# Patient Record
Sex: FEMALE | Race: OTHER | HISPANIC OR LATINO | URBAN - METROPOLITAN AREA
[De-identification: names, ages, dates, MRNs, and addresses within clinical notes are randomized per-mention and may not be internally consistent; named-entity substitution may affect disease eponyms.]

---

## 2017-03-02 ENCOUNTER — EMERGENCY (EMERGENCY)
Facility: HOSPITAL | Age: 52
LOS: 1 days | Discharge: HOME | End: 2017-03-02
Attending: EMERGENCY MEDICINE
Payer: COMMERCIAL

## 2017-03-02 VITALS
SYSTOLIC BLOOD PRESSURE: 146 MMHG | HEART RATE: 83 BPM | TEMPERATURE: 97 F | DIASTOLIC BLOOD PRESSURE: 90 MMHG | HEIGHT: 60 IN | WEIGHT: 149.91 LBS | RESPIRATION RATE: 20 BRPM | OXYGEN SATURATION: 95 %

## 2017-03-02 DIAGNOSIS — R51 HEADACHE: ICD-10-CM

## 2017-03-02 DIAGNOSIS — R11.2 NAUSEA WITH VOMITING, UNSPECIFIED: ICD-10-CM

## 2017-03-02 DIAGNOSIS — E03.9 HYPOTHYROIDISM, UNSPECIFIED: ICD-10-CM

## 2017-03-02 LAB
ALBUMIN SERPL ELPH-MCNC: 3.6 G/DL — SIGNIFICANT CHANGE UP (ref 3.3–5.2)
ALP SERPL-CCNC: 110 U/L — SIGNIFICANT CHANGE UP (ref 40–120)
ALT FLD-CCNC: 23 U/L — SIGNIFICANT CHANGE UP
ANION GAP SERPL CALC-SCNC: 8 MMOL/L — SIGNIFICANT CHANGE UP (ref 5–17)
AST SERPL-CCNC: 24 U/L — SIGNIFICANT CHANGE UP
BASOPHILS # BLD AUTO: 0 K/UL — SIGNIFICANT CHANGE UP (ref 0–0.2)
BASOPHILS NFR BLD AUTO: 0.3 % — SIGNIFICANT CHANGE UP (ref 0–2)
BILIRUB SERPL-MCNC: 0.1 MG/DL — LOW (ref 0.4–2)
BUN SERPL-MCNC: 18 MG/DL — SIGNIFICANT CHANGE UP (ref 8–20)
CALCIUM SERPL-MCNC: 9.6 MG/DL — SIGNIFICANT CHANGE UP (ref 8.6–10.2)
CHLORIDE SERPL-SCNC: 106 MMOL/L — SIGNIFICANT CHANGE UP (ref 98–107)
CO2 SERPL-SCNC: 28 MMOL/L — SIGNIFICANT CHANGE UP (ref 22–29)
CREAT SERPL-MCNC: 1.01 MG/DL — SIGNIFICANT CHANGE UP (ref 0.5–1.3)
EOSINOPHIL # BLD AUTO: 0.1 K/UL — SIGNIFICANT CHANGE UP (ref 0–0.5)
EOSINOPHIL NFR BLD AUTO: 1.1 % — SIGNIFICANT CHANGE UP (ref 0–6)
GLUCOSE SERPL-MCNC: 123 MG/DL — HIGH (ref 70–115)
HCT VFR BLD CALC: 33.1 % — LOW (ref 37–47)
HGB BLD-MCNC: 10.6 G/DL — LOW (ref 12–16)
LYMPHOCYTES # BLD AUTO: 1.5 K/UL — SIGNIFICANT CHANGE UP (ref 1–4.8)
LYMPHOCYTES # BLD AUTO: 17.1 % — LOW (ref 20–55)
MCHC RBC-ENTMCNC: 27.6 PG — SIGNIFICANT CHANGE UP (ref 27–31)
MCHC RBC-ENTMCNC: 32 G/DL — SIGNIFICANT CHANGE UP (ref 32–36)
MCV RBC AUTO: 86.2 FL — SIGNIFICANT CHANGE UP (ref 81–99)
MONOCYTES # BLD AUTO: 0.4 K/UL — SIGNIFICANT CHANGE UP (ref 0–0.8)
MONOCYTES NFR BLD AUTO: 4.3 % — SIGNIFICANT CHANGE UP (ref 3–10)
NEUTROPHILS # BLD AUTO: 6.9 K/UL — SIGNIFICANT CHANGE UP (ref 1.8–8)
NEUTROPHILS NFR BLD AUTO: 77 % — HIGH (ref 37–73)
PLATELET # BLD AUTO: 361 K/UL — SIGNIFICANT CHANGE UP (ref 150–400)
POTASSIUM SERPL-MCNC: 4.4 MMOL/L — SIGNIFICANT CHANGE UP (ref 3.5–5.3)
POTASSIUM SERPL-SCNC: 4.4 MMOL/L — SIGNIFICANT CHANGE UP (ref 3.5–5.3)
PROT SERPL-MCNC: 8 G/DL — SIGNIFICANT CHANGE UP (ref 6.6–8.7)
RBC # BLD: 3.84 M/UL — LOW (ref 4.4–5.2)
RBC # FLD: 14.9 % — SIGNIFICANT CHANGE UP (ref 11–15.6)
SODIUM SERPL-SCNC: 142 MMOL/L — SIGNIFICANT CHANGE UP (ref 135–145)
WBC # BLD: 8.9 K/UL — SIGNIFICANT CHANGE UP (ref 4.8–10.8)
WBC # FLD AUTO: 8.9 K/UL — SIGNIFICANT CHANGE UP (ref 4.8–10.8)

## 2017-03-02 PROCEDURE — 70450 CT HEAD/BRAIN W/O DYE: CPT

## 2017-03-02 PROCEDURE — 70450 CT HEAD/BRAIN W/O DYE: CPT | Mod: 26

## 2017-03-02 PROCEDURE — 80053 COMPREHEN METABOLIC PANEL: CPT

## 2017-03-02 PROCEDURE — 99053 MED SERV 10PM-8AM 24 HR FAC: CPT

## 2017-03-02 PROCEDURE — 96374 THER/PROPH/DIAG INJ IV PUSH: CPT

## 2017-03-02 PROCEDURE — 99284 EMERGENCY DEPT VISIT MOD MDM: CPT | Mod: 25

## 2017-03-02 PROCEDURE — 96375 TX/PRO/DX INJ NEW DRUG ADDON: CPT

## 2017-03-02 PROCEDURE — 85027 COMPLETE CBC AUTOMATED: CPT

## 2017-03-02 RX ORDER — SODIUM CHLORIDE 9 MG/ML
1000 INJECTION INTRAMUSCULAR; INTRAVENOUS; SUBCUTANEOUS ONCE
Qty: 0 | Refills: 0 | Status: COMPLETED | OUTPATIENT
Start: 2017-03-02 | End: 2017-03-02

## 2017-03-02 RX ORDER — KETOROLAC TROMETHAMINE 30 MG/ML
30 SYRINGE (ML) INJECTION ONCE
Qty: 0 | Refills: 0 | Status: DISCONTINUED | OUTPATIENT
Start: 2017-03-02 | End: 2017-03-02

## 2017-03-02 RX ORDER — ONDANSETRON 8 MG/1
1 TABLET, FILM COATED ORAL
Qty: 12 | Refills: 0
Start: 2017-03-02 | End: 2017-03-06

## 2017-03-02 RX ORDER — ACETAMINOPHEN 500 MG
1000 TABLET ORAL ONCE
Qty: 0 | Refills: 0 | Status: COMPLETED | OUTPATIENT
Start: 2017-03-02 | End: 2017-03-02

## 2017-03-02 RX ORDER — METOCLOPRAMIDE HCL 10 MG
10 TABLET ORAL ONCE
Qty: 0 | Refills: 0 | Status: COMPLETED | OUTPATIENT
Start: 2017-03-02 | End: 2017-03-02

## 2017-03-02 RX ORDER — SODIUM CHLORIDE 9 MG/ML
1000 INJECTION INTRAMUSCULAR; INTRAVENOUS; SUBCUTANEOUS
Qty: 0 | Refills: 0 | Status: DISCONTINUED | OUTPATIENT
Start: 2017-03-02 | End: 2017-03-06

## 2017-03-02 RX ADMIN — SODIUM CHLORIDE 1000 MILLILITER(S): 9 INJECTION INTRAMUSCULAR; INTRAVENOUS; SUBCUTANEOUS at 08:58

## 2017-03-02 RX ADMIN — Medication 10 MILLIGRAM(S): at 08:56

## 2017-03-02 RX ADMIN — Medication 400 MILLIGRAM(S): at 12:00

## 2017-03-02 RX ADMIN — Medication 30 MILLIGRAM(S): at 08:56

## 2017-03-02 RX ADMIN — SODIUM CHLORIDE 150 MILLILITER(S): 9 INJECTION INTRAMUSCULAR; INTRAVENOUS; SUBCUTANEOUS at 11:30

## 2017-03-02 RX ADMIN — Medication 30 MILLIGRAM(S): at 09:34

## 2017-03-02 RX ADMIN — Medication 1000 MILLIGRAM(S): at 12:58

## 2017-03-02 NOTE — ED STATDOCS - MEDICAL DECISION MAKING DETAILS
Patient with HA described as different from usual HA s/p mild trauma. will do CT, Labs, give fluids, Reglan, Toradol, re-eval.

## 2017-03-02 NOTE — ED STATDOCS - ENMT, MLM
[place] sized non boggy bruise to left parietal area. No Raccoon eyes, Quezada's sign, otorrhea, rhinorrhea.

## 2017-03-02 NOTE — ED STATDOCS - OBJECTIVE STATEMENT
52 y/o female, h/o hypothyroidism, presents c/o HA x 2 days. She notes onset occurred 1 day after she banged her head against an attic ceiling accidentally. Denies LOC. Patient took 3 Aleve last night before bed. Pt has h/o tension headaches but notes this is different from her usual HAs. Denies illicit drug use, smoking history. Pt also has associated nausea and 1 episode of vomiting this morning. No further complaints at this time.

## 2017-03-02 NOTE — ED ADULT NURSE NOTE - OBJECTIVE STATEMENT
pt presents to ED with headache x few days s/p hit her head on metal beam in attic. pt denies LOC.; pt c/o n/v since yesterday. breahting is even and unlabored. fluids infusing well. will continue to monitor and reassess

## 2017-03-02 NOTE — ED STATDOCS - PROGRESS NOTE DETAILS
PA NOTE: Pt seen by intake physician and hpi/orders/plan reviewed. PT presenting to ED with complaints of headache x2 days.  Patient states that she hit her head on a beam in the attic.  Denies LOC  PE: GEN: Awake, alert,  NAD,  EYES: PERRL CARDIAC: Reg rate and rhythm, S1,S2, RRR  RESP: No distress noted. Lungs CTA bilaterally no wheeze, ronchi, rales. ABD: soft,  non-tender, no guarding. . NEURO: AOx3, no focal deficits   PLAN: CT and reassess pt states that pain was originally 10/10 this morning, now 5/10 pain now 3/10

## 2017-03-02 NOTE — ED STATDOCS - NS ED MD SCRIBE ATTENDING SCRIBE SECTIONS
REVIEW OF SYSTEMS/HISTORY OF PRESENT ILLNESS/PAST MEDICAL/SURGICAL/SOCIAL HISTORY/HIV/PHYSICAL EXAM/DISPOSITION/VITAL SIGNS( Pullset)

## 2017-03-02 NOTE — ED STATDOCS - NEUROLOGICAL, MLM
Normal sensation in all quadrants of face, normal strength in each side of face, tongue midline, normal jaw clench, symmetrical smile, normal shoulder shrug, no pronator drift, good strength upper and lower extremities, normal thumb to other fingers, hand over hand and finger to nose, normal heel knee shin, normal lower extremity DTRs, no babinski. Mild difficulty walking straight line.

## 2017-07-04 ENCOUNTER — EMERGENCY (EMERGENCY)
Facility: HOSPITAL | Age: 52
LOS: 1 days | Discharge: HOME | End: 2017-07-04
Attending: STUDENT IN AN ORGANIZED HEALTH CARE EDUCATION/TRAINING PROGRAM
Payer: MEDICAID

## 2017-07-04 VITALS
SYSTOLIC BLOOD PRESSURE: 118 MMHG | TEMPERATURE: 98 F | DIASTOLIC BLOOD PRESSURE: 75 MMHG | HEART RATE: 77 BPM | OXYGEN SATURATION: 96 % | RESPIRATION RATE: 18 BRPM

## 2017-07-04 VITALS
OXYGEN SATURATION: 97 % | WEIGHT: 160.06 LBS | TEMPERATURE: 97 F | SYSTOLIC BLOOD PRESSURE: 112 MMHG | RESPIRATION RATE: 18 BRPM | HEART RATE: 84 BPM | HEIGHT: 60 IN | DIASTOLIC BLOOD PRESSURE: 84 MMHG

## 2017-07-04 PROCEDURE — 72040 X-RAY EXAM NECK SPINE 2-3 VW: CPT | Mod: 26

## 2017-07-04 PROCEDURE — 72040 X-RAY EXAM NECK SPINE 2-3 VW: CPT

## 2017-07-04 PROCEDURE — 73564 X-RAY EXAM KNEE 4 OR MORE: CPT

## 2017-07-04 PROCEDURE — 90471 IMMUNIZATION ADMIN: CPT

## 2017-07-04 PROCEDURE — 99284 EMERGENCY DEPT VISIT MOD MDM: CPT | Mod: 25

## 2017-07-04 PROCEDURE — 73564 X-RAY EXAM KNEE 4 OR MORE: CPT | Mod: 26,LT

## 2017-07-04 PROCEDURE — 90715 TDAP VACCINE 7 YRS/> IM: CPT

## 2017-07-04 PROCEDURE — 73562 X-RAY EXAM OF KNEE 3: CPT

## 2017-07-04 RX ORDER — TETANUS TOXOID, REDUCED DIPHTHERIA TOXOID AND ACELLULAR PERTUSSIS VACCINE, ADSORBED 5; 2.5; 8; 8; 2.5 [IU]/.5ML; [IU]/.5ML; UG/.5ML; UG/.5ML; UG/.5ML
0.5 SUSPENSION INTRAMUSCULAR ONCE
Qty: 0 | Refills: 0 | Status: COMPLETED | OUTPATIENT
Start: 2017-07-04 | End: 2017-07-04

## 2017-07-04 RX ORDER — METHOCARBAMOL 500 MG/1
750 TABLET, FILM COATED ORAL ONCE
Qty: 0 | Refills: 0 | Status: COMPLETED | OUTPATIENT
Start: 2017-07-04 | End: 2017-07-04

## 2017-07-04 RX ORDER — IBUPROFEN 200 MG
600 TABLET ORAL ONCE
Qty: 0 | Refills: 0 | Status: COMPLETED | OUTPATIENT
Start: 2017-07-04 | End: 2017-07-04

## 2017-07-04 RX ADMIN — METHOCARBAMOL 750 MILLIGRAM(S): 500 TABLET, FILM COATED ORAL at 03:36

## 2017-07-04 RX ADMIN — TETANUS TOXOID, REDUCED DIPHTHERIA TOXOID AND ACELLULAR PERTUSSIS VACCINE, ADSORBED 0.5 MILLILITER(S): 5; 2.5; 8; 8; 2.5 SUSPENSION INTRAMUSCULAR at 03:36

## 2017-07-04 RX ADMIN — Medication 600 MILLIGRAM(S): at 03:36
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
## 2017-07-04 NOTE — ED PROVIDER NOTE - MEDICAL DECISION MAKING DETAILS
Will check X-ray, medicate and re-eval. Sister at bedside will take care of pt when dc pt  is dc'd home.
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
## 2017-09-25 PROBLEM — Z00.00 ENCOUNTER FOR PREVENTIVE HEALTH EXAMINATION: Status: ACTIVE | Noted: 2017-09-25

## 2017-10-13 ENCOUNTER — APPOINTMENT (OUTPATIENT)
Dept: NEUROLOGY | Facility: CLINIC | Age: 52
End: 2017-10-13

## 2017-11-02 ENCOUNTER — RESULT REVIEW (OUTPATIENT)
Age: 52
End: 2017-11-02

## 2019-10-08 ENCOUNTER — APPOINTMENT (OUTPATIENT)
Dept: ENDOCRINOLOGY | Facility: CLINIC | Age: 54
End: 2019-10-08

## 2021-07-12 ENCOUNTER — EMERGENCY (EMERGENCY)
Facility: HOSPITAL | Age: 56
LOS: 1 days | Discharge: HOME | End: 2021-07-12
Attending: EMERGENCY MEDICINE
Payer: MEDICAID

## 2021-07-12 VITALS
RESPIRATION RATE: 34 BRPM | OXYGEN SATURATION: 100 % | HEIGHT: 60 IN | SYSTOLIC BLOOD PRESSURE: 180 MMHG | DIASTOLIC BLOOD PRESSURE: 140 MMHG | HEART RATE: 112 BPM

## 2021-07-12 DIAGNOSIS — Z98.891 HISTORY OF UTERINE SCAR FROM PREVIOUS SURGERY: Chronic | ICD-10-CM

## 2021-07-12 LAB
ALBUMIN SERPL ELPH-MCNC: 3.9 G/DL — SIGNIFICANT CHANGE UP (ref 3.3–5.2)
ALP SERPL-CCNC: 114 U/L — SIGNIFICANT CHANGE UP (ref 40–120)
ALT FLD-CCNC: 19 U/L — SIGNIFICANT CHANGE UP
ANION GAP SERPL CALC-SCNC: 13 MMOL/L — SIGNIFICANT CHANGE UP (ref 5–17)
APTT BLD: 32.9 SEC — SIGNIFICANT CHANGE UP (ref 27.5–35.5)
AST SERPL-CCNC: 23 U/L — SIGNIFICANT CHANGE UP
BASOPHILS # BLD AUTO: 0.04 K/UL — SIGNIFICANT CHANGE UP (ref 0–0.2)
BASOPHILS NFR BLD AUTO: 0.3 % — SIGNIFICANT CHANGE UP (ref 0–2)
BILIRUB SERPL-MCNC: 0.4 MG/DL — SIGNIFICANT CHANGE UP (ref 0.4–2)
BUN SERPL-MCNC: 25.3 MG/DL — HIGH (ref 8–20)
CALCIUM SERPL-MCNC: 9.4 MG/DL — SIGNIFICANT CHANGE UP (ref 8.6–10.2)
CHLORIDE SERPL-SCNC: 102 MMOL/L — SIGNIFICANT CHANGE UP (ref 98–107)
CO2 SERPL-SCNC: 24 MMOL/L — SIGNIFICANT CHANGE UP (ref 22–29)
CREAT SERPL-MCNC: 1.57 MG/DL — HIGH (ref 0.5–1.3)
EOSINOPHIL # BLD AUTO: 0.14 K/UL — SIGNIFICANT CHANGE UP (ref 0–0.5)
EOSINOPHIL NFR BLD AUTO: 1.2 % — SIGNIFICANT CHANGE UP (ref 0–6)
GLUCOSE SERPL-MCNC: 91 MG/DL — SIGNIFICANT CHANGE UP (ref 70–99)
HCT VFR BLD CALC: 38.9 % — SIGNIFICANT CHANGE UP (ref 34.5–45)
HGB BLD-MCNC: 12.5 G/DL — SIGNIFICANT CHANGE UP (ref 11.5–15.5)
IMM GRANULOCYTES NFR BLD AUTO: 0.5 % — SIGNIFICANT CHANGE UP (ref 0–1.5)
INR BLD: 1.12 RATIO — SIGNIFICANT CHANGE UP (ref 0.88–1.16)
LYMPHOCYTES # BLD AUTO: 16.7 % — SIGNIFICANT CHANGE UP (ref 13–44)
LYMPHOCYTES # BLD AUTO: 2.03 K/UL — SIGNIFICANT CHANGE UP (ref 1–3.3)
MCHC RBC-ENTMCNC: 28.4 PG — SIGNIFICANT CHANGE UP (ref 27–34)
MCHC RBC-ENTMCNC: 32.1 GM/DL — SIGNIFICANT CHANGE UP (ref 32–36)
MCV RBC AUTO: 88.4 FL — SIGNIFICANT CHANGE UP (ref 80–100)
MONOCYTES # BLD AUTO: 0.5 K/UL — SIGNIFICANT CHANGE UP (ref 0–0.9)
MONOCYTES NFR BLD AUTO: 4.1 % — SIGNIFICANT CHANGE UP (ref 2–14)
NEUTROPHILS # BLD AUTO: 9.39 K/UL — HIGH (ref 1.8–7.4)
NEUTROPHILS NFR BLD AUTO: 77.2 % — HIGH (ref 43–77)
PLATELET # BLD AUTO: 327 K/UL — SIGNIFICANT CHANGE UP (ref 150–400)
POTASSIUM SERPL-MCNC: 4.1 MMOL/L — SIGNIFICANT CHANGE UP (ref 3.5–5.3)
POTASSIUM SERPL-SCNC: 4.1 MMOL/L — SIGNIFICANT CHANGE UP (ref 3.5–5.3)
PROT SERPL-MCNC: 8.1 G/DL — SIGNIFICANT CHANGE UP (ref 6.6–8.7)
PROTHROM AB SERPL-ACNC: 12.9 SEC — SIGNIFICANT CHANGE UP (ref 10.6–13.6)
RBC # BLD: 4.4 M/UL — SIGNIFICANT CHANGE UP (ref 3.8–5.2)
RBC # FLD: 14.8 % — HIGH (ref 10.3–14.5)
SODIUM SERPL-SCNC: 139 MMOL/L — SIGNIFICANT CHANGE UP (ref 135–145)
WBC # BLD: 12.16 K/UL — HIGH (ref 3.8–10.5)
WBC # FLD AUTO: 12.16 K/UL — HIGH (ref 3.8–10.5)

## 2021-07-12 PROCEDURE — 71275 CT ANGIOGRAPHY CHEST: CPT | Mod: 26,MA

## 2021-07-12 PROCEDURE — 93010 ELECTROCARDIOGRAM REPORT: CPT

## 2021-07-12 PROCEDURE — 99220: CPT

## 2021-07-12 PROCEDURE — 93010 ELECTROCARDIOGRAM REPORT: CPT | Mod: 77

## 2021-07-12 PROCEDURE — 71045 X-RAY EXAM CHEST 1 VIEW: CPT | Mod: 26

## 2021-07-12 PROCEDURE — 99285 EMERGENCY DEPT VISIT HI MDM: CPT

## 2021-07-12 PROCEDURE — 93971 EXTREMITY STUDY: CPT | Mod: 26,RT

## 2021-07-12 RX ORDER — LOSARTAN POTASSIUM 100 MG/1
50 TABLET, FILM COATED ORAL DAILY
Refills: 0 | Status: DISCONTINUED | OUTPATIENT
Start: 2021-07-12 | End: 2021-07-17

## 2021-07-12 RX ORDER — MORPHINE SULFATE 50 MG/1
8 CAPSULE, EXTENDED RELEASE ORAL ONCE
Refills: 0 | Status: DISCONTINUED | OUTPATIENT
Start: 2021-07-12 | End: 2021-07-12

## 2021-07-12 RX ORDER — LOSARTAN POTASSIUM 100 MG/1
1 TABLET, FILM COATED ORAL
Qty: 0 | Refills: 0 | DISCHARGE

## 2021-07-12 RX ORDER — LEVOTHYROXINE SODIUM 125 MCG
200 TABLET ORAL DAILY
Refills: 0 | Status: DISCONTINUED | OUTPATIENT
Start: 2021-07-12 | End: 2021-07-17

## 2021-07-12 RX ORDER — SODIUM CHLORIDE 9 MG/ML
1000 INJECTION INTRAMUSCULAR; INTRAVENOUS; SUBCUTANEOUS ONCE
Refills: 0 | Status: COMPLETED | OUTPATIENT
Start: 2021-07-12 | End: 2021-07-12

## 2021-07-12 RX ORDER — ATORVASTATIN CALCIUM 80 MG/1
20 TABLET, FILM COATED ORAL AT BEDTIME
Refills: 0 | Status: DISCONTINUED | OUTPATIENT
Start: 2021-07-12 | End: 2021-07-17

## 2021-07-12 RX ORDER — METFORMIN HYDROCHLORIDE 850 MG/1
1 TABLET ORAL
Qty: 0 | Refills: 0 | DISCHARGE

## 2021-07-12 RX ORDER — LEVOTHYROXINE SODIUM 125 MCG
1 TABLET ORAL
Qty: 0 | Refills: 0 | DISCHARGE

## 2021-07-12 RX ORDER — METFORMIN HYDROCHLORIDE 850 MG/1
750 TABLET ORAL
Refills: 0 | Status: DISCONTINUED | OUTPATIENT
Start: 2021-07-12 | End: 2021-07-17

## 2021-07-12 RX ORDER — IPRATROPIUM/ALBUTEROL SULFATE 18-103MCG
3 AEROSOL WITH ADAPTER (GRAM) INHALATION ONCE
Refills: 0 | Status: DISCONTINUED | OUTPATIENT
Start: 2021-07-12 | End: 2021-07-17

## 2021-07-12 RX ORDER — ATORVASTATIN CALCIUM 80 MG/1
1 TABLET, FILM COATED ORAL
Qty: 0 | Refills: 0 | DISCHARGE

## 2021-07-12 RX ORDER — KETOROLAC TROMETHAMINE 30 MG/ML
15 SYRINGE (ML) INJECTION ONCE
Refills: 0 | Status: DISCONTINUED | OUTPATIENT
Start: 2021-07-12 | End: 2021-07-12

## 2021-07-12 RX ADMIN — SODIUM CHLORIDE 1000 MILLILITER(S): 9 INJECTION INTRAMUSCULAR; INTRAVENOUS; SUBCUTANEOUS at 18:22

## 2021-07-12 RX ADMIN — MORPHINE SULFATE 8 MILLIGRAM(S): 50 CAPSULE, EXTENDED RELEASE ORAL at 18:22

## 2021-07-12 RX ADMIN — Medication 15 MILLIGRAM(S): at 19:53

## 2021-07-12 NOTE — ED CDU PROVIDER INITIAL DAY NOTE - PHYSICAL EXAMINATION
General: In NAD, non-diaphoretic, non-toxic appearing, anxious appearing.   Skin: No rashes or lesions. Warm, dry, color normal for race.   Eyes: Sclera anicteric, conjunctivae clear b/l. No discharge. PERRLA, EOMI.  Neck: Supple, FROM.  Cardio: Rate and rhythm regular. No audible murmur, gallop, or rub.   PV: No edema of feet/ankles. Mild right calf TTP, no calf swelling. Pulses: b/l 2+ radial, DP, PT. Capillary refill <2 seconds.  Resp: Mildly tachypneic. Normal AP to lateral diameter, symmetrical excursion b/l. Breath sounds vesicular, symmetrical and without rales, rhonchi or wheezing b/l.  Abd: Non-distended. Soft, non-tender, no masses palpated. No rebound, guarding.   MSK: MAEx4. FROM.   Neuro: A&Ox3. No motor/sensory deficits.

## 2021-07-12 NOTE — ED CDU PROVIDER INITIAL DAY NOTE - OBJECTIVE STATEMENT
55 yo female PMHx IgA nephropathy, HTN, NIDDM2, HLD, hypothyroidism, migraines presents to ED c/o chest pain at approx. 1730. Patient arrived home from [place] today. Patient with sternal chest pain, constant. Also with back pain described as an ache, SOB, and RLE pain over the last few days described as cramping in nature. No further complaints at this time.   Denies fevers, chills, nausea, vomiting, diarrhea.

## 2021-07-12 NOTE — ED ADULT NURSE NOTE - OBJECTIVE STATEMENT
pt BIBA from home with reports of right leg pain with sudden onset chest pain and pressure. pt with inability to speak on arrival due to pain. pt reports she just flew home from [place] yesterday. pt with pedal pulses present and equal bilaterally. skin warm dry and intact.

## 2021-07-12 NOTE — ED CDU PROVIDER INITIAL DAY NOTE - MEDICAL DECISION MAKING DETAILS
57 yo female PMHx IgA nephropathy, HTN, NIDDM2, HLD, hypothyroidism, migraines presents to ED c/o chest pain at approx. 1730. In ED, labs, CTA, US doppler negative. Patient placed in CDU for telemetry monitoring, serial EKG/troponin, cardiology consult (placed by ED ).

## 2021-07-12 NOTE — ED PROVIDER NOTE - OBJECTIVE STATEMENT
Pt is a 55yo F presenting with chest pain. Patient states that she came back from [place] today. Patient is endorsing 10/10 sharp chest pain radiating to her back beginning 15 minutes before coming to the ER. She also reports shortness of breath and RLE pain. Patient reports difficulty breathing that started around the same time. Patient endorses PMH of DM, HTN, kidney disease. Denies fevers, chills, nausea, vomiting, diarrhea Pt is a 57yo F presenting with chest pain. Patient states that she came back from [place] today. Patient is endorsing 10/10 sharp chest pain radiating to her back beginning 15 minutes before coming to the ER. She also reports shortness of breath and RLE pain. RLE pain for last few days, cramping in nature. Patient reports difficulty breathing that started around the same time. Patient endorses PMH of DM, HTN, kidney disease. Denies fevers, chills, nausea, vomiting, diarrhea

## 2021-07-12 NOTE — ED PROVIDER NOTE - PHYSICAL EXAMINATION
General: Ill appearing female in acute respiratory distress  HEENT: Normocephalic, atraumatic.   Eyes: No scleral icterus. No conjunctival pallor. EOMI. FORTUNATO.  Neck: Soft and supple. Full ROM without pain.   Cardiac: Tachycardic rate and regular rhythm. No murmurs.   Resp: Clear lung sounds, shallow tachypneic breathing.   Abd: Soft, non-tender, non-distended.   Back: Spine midline and non-tender.   Skin: RLE TTP with calf pressure

## 2021-07-12 NOTE — ED PROCEDURE NOTE - PROCEDURE ADDITIONAL DETAILS
acute onset CP/dyspnea, exam limited due to patient tachypnea/pain. Follow up CTA. grossly no dilation RV, no pericardial effusion

## 2021-07-12 NOTE — ED PROVIDER NOTE - NS ED ROS FT
General: Denies fever, chills  HEENT: Denies visual changes, sore throat  Neck: Denies neck pain, neck stiffness  Resp: Endorses  SOB  Cardiovascular: Endorses CP. Denies palpitations, LE edema  GI: Denies abdominal pain, nausea, vomiting  : Denies dysuria, hematuria, incontinence  MSK: Endorses back pain  Skin: Denies rashes
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
## 2021-07-12 NOTE — ED CDU PROVIDER INITIAL DAY NOTE - FAMILY HISTORY
Sibling  Still living? Unknown  Family history of hypertension, Age at diagnosis: Age Unknown  Family history of CHF (congestive heart failure), Age at diagnosis: Age Unknown  Family history of chronic kidney disease, Age at diagnosis: Age Unknown

## 2021-07-12 NOTE — ED PROVIDER NOTE - ATTENDING CONTRIBUTION TO CARE
I, Marilee Branch, have personally seen and examined this patient. I have fully participated in the care of this patient. I have reviewed all pertinent clinical information, including history, physical exam, plan and the Resident's note and agree except as noted below.     Pt with DM, HTN, IgA nephropathy, a few day of cramping rt chest pain, today at home after flight from [place] sudden onset chest tightness, non-radiating with SOB, CP pleuritic, worse midsternal and reproducible. no vomiting. no diaphoresis. EMS gave 4 ASA and NRB but no hypoxia. pain in leg cramping in nature now.     Gen: moderate distress- splinting, AOx3  Head: NCAT  HEENT: EOMI, oral mucosa moist, normal conjunctiva, neck supple, no stridor  Lung: CTAB, no respiratory distress  CV: tachy regular, no murmur, Normal perfusion, +reproducible chest wall pain   Abd: soft, NTND  MSK: No edema, no visible deformities, +ttp rt calf   Neuro: No focal neurologic deficits  Skin: No rash   Psych: normal affect     patient with recent travel with leg cvramping and pleuritic pain, will get stat CTA r/o PE, less likely dissection. EKG no SVETLANA. atypical for ACS. will get serial EKG. morphine. BP elevated on arrival but likely pain related no NO due to possibility of preload dependent PE. reasses

## 2021-07-12 NOTE — ED ADULT TRIAGE NOTE - CHIEF COMPLAINT QUOTE
pt a+ox3, BIBA c/o sudden onset of crushing chest pain and SOB. reports  receent travel from [place] yesterday. MD called for brandon.

## 2021-07-12 NOTE — ED ADULT NURSE NOTE - CAS ELECT INFOMATION PROVIDED
DC instructions provided and reviewed with patient by Yan Uriostegui. Patient in understanding of all dc instructions. no further questions for the RN regarding DC. VSS. Ambulatory./DC instructions

## 2021-07-12 NOTE — ED CDU PROVIDER INITIAL DAY NOTE - ATTENDING CONTRIBUTION TO CARE
HPI: 57yo F with midsternal sharp chest pain a/w shortness of breath and back pain that started today, no fevers/chills/vomiting/diarrhea. Seen in ED, CTA neg for PE, EKG no ischemia, trop neg. Placed in obs for serial trop, telemetry monitoring and cardiology consultation.     PE:  Gen: anxious appearing, NAD, awake, alert  Head: NCAT  HEENT: Oral mucosa moist, normal conjunctiva  CV: RRR no murmurs, normal perfusion  Resp: diminished breaths sounds throughout likely due to hypoventilation, no wheezing/rales  GI: Abd Soft NTND  Neuro: No focal neuro deficits  MSK: FROM all 4 extremities, no deformity  Skin: No rash, no bruising  Psych: Normal affect    MDM: Pt with midsternal chest pain, CTA neg, ekg no ischemic changes, plan for serial trop, cardiology c/s. Lizzy Lopez DO     I performed a history and physical exam of the patient and discussed their management with the advanced care provider. I reviewed the advanced care provider's note and agree with the documented findings and plan of care. My medical decision making and objective findings are found above.

## 2021-07-12 NOTE — ED ADULT NURSE NOTE - CHIEF COMPLAINT QUOTE
pt a+ox3, BIBA c/o sudden onset of crushing chest pain and SOB. reports  receent travel from [place] yesterday. MD called for brandon.

## 2021-07-13 VITALS
TEMPERATURE: 98 F | SYSTOLIC BLOOD PRESSURE: 101 MMHG | DIASTOLIC BLOOD PRESSURE: 70 MMHG | OXYGEN SATURATION: 95 % | HEART RATE: 84 BPM | RESPIRATION RATE: 18 BRPM

## 2021-07-13 LAB
SARS-COV-2 RNA SPEC QL NAA+PROBE: SIGNIFICANT CHANGE UP
TROPONIN T SERPL-MCNC: <0.01 NG/ML — SIGNIFICANT CHANGE UP (ref 0–0.06)
TROPONIN T SERPL-MCNC: <0.01 NG/ML — SIGNIFICANT CHANGE UP (ref 0–0.06)

## 2021-07-13 PROCEDURE — 96376 TX/PRO/DX INJ SAME DRUG ADON: CPT

## 2021-07-13 PROCEDURE — 99217: CPT

## 2021-07-13 PROCEDURE — 93010 ELECTROCARDIOGRAM REPORT: CPT

## 2021-07-13 PROCEDURE — 85610 PROTHROMBIN TIME: CPT

## 2021-07-13 PROCEDURE — 71045 X-RAY EXAM CHEST 1 VIEW: CPT

## 2021-07-13 PROCEDURE — 93018 CV STRESS TEST I&R ONLY: CPT

## 2021-07-13 PROCEDURE — 99284 EMERGENCY DEPT VISIT MOD MDM: CPT | Mod: 25

## 2021-07-13 PROCEDURE — 36415 COLL VENOUS BLD VENIPUNCTURE: CPT

## 2021-07-13 PROCEDURE — 93005 ELECTROCARDIOGRAM TRACING: CPT

## 2021-07-13 PROCEDURE — 83880 ASSAY OF NATRIURETIC PEPTIDE: CPT

## 2021-07-13 PROCEDURE — 78452 HT MUSCLE IMAGE SPECT MULT: CPT | Mod: 26

## 2021-07-13 PROCEDURE — U0003: CPT

## 2021-07-13 PROCEDURE — 93306 TTE W/DOPPLER COMPLETE: CPT | Mod: 26

## 2021-07-13 PROCEDURE — 71275 CT ANGIOGRAPHY CHEST: CPT

## 2021-07-13 PROCEDURE — 84702 CHORIONIC GONADOTROPIN TEST: CPT

## 2021-07-13 PROCEDURE — 78452 HT MUSCLE IMAGE SPECT MULT: CPT

## 2021-07-13 PROCEDURE — 93016 CV STRESS TEST SUPVJ ONLY: CPT

## 2021-07-13 PROCEDURE — 85730 THROMBOPLASTIN TIME PARTIAL: CPT

## 2021-07-13 PROCEDURE — G0378: CPT

## 2021-07-13 PROCEDURE — 96375 TX/PRO/DX INJ NEW DRUG ADDON: CPT

## 2021-07-13 PROCEDURE — U0005: CPT

## 2021-07-13 PROCEDURE — C8929: CPT

## 2021-07-13 PROCEDURE — 96374 THER/PROPH/DIAG INJ IV PUSH: CPT

## 2021-07-13 PROCEDURE — 93017 CV STRESS TEST TRACING ONLY: CPT

## 2021-07-13 PROCEDURE — 85025 COMPLETE CBC W/AUTO DIFF WBC: CPT

## 2021-07-13 PROCEDURE — 84484 ASSAY OF TROPONIN QUANT: CPT

## 2021-07-13 PROCEDURE — A9500: CPT

## 2021-07-13 PROCEDURE — 93971 EXTREMITY STUDY: CPT

## 2021-07-13 PROCEDURE — 80053 COMPREHEN METABOLIC PANEL: CPT

## 2021-07-13 RX ORDER — KETOROLAC TROMETHAMINE 30 MG/ML
15 SYRINGE (ML) INJECTION ONCE
Refills: 0 | Status: DISCONTINUED | OUTPATIENT
Start: 2021-07-13 | End: 2021-07-13

## 2021-07-13 RX ORDER — METOCLOPRAMIDE HCL 10 MG
10 TABLET ORAL ONCE
Refills: 0 | Status: COMPLETED | OUTPATIENT
Start: 2021-07-13 | End: 2021-07-13

## 2021-07-13 RX ORDER — ACETAMINOPHEN 500 MG
650 TABLET ORAL ONCE
Refills: 0 | Status: COMPLETED | OUTPATIENT
Start: 2021-07-13 | End: 2021-07-13

## 2021-07-13 RX ADMIN — Medication 1 MILLIGRAM(S): at 00:19

## 2021-07-13 RX ADMIN — LOSARTAN POTASSIUM 50 MILLIGRAM(S): 100 TABLET, FILM COATED ORAL at 07:50

## 2021-07-13 RX ADMIN — Medication 15 MILLIGRAM(S): at 14:57

## 2021-07-13 RX ADMIN — Medication 104 MILLIGRAM(S): at 13:41

## 2021-07-13 RX ADMIN — METFORMIN HYDROCHLORIDE 750 MILLIGRAM(S): 850 TABLET ORAL at 07:49

## 2021-07-13 RX ADMIN — Medication 200 MICROGRAM(S): at 07:50

## 2021-07-13 NOTE — CONSULT NOTE ADULT - ATTENDING COMMENTS
pt seen and examined  Hx of htn, hld and dm presents with pleuritic cp  CTA chest negative for PE.  No DVT either. She is short of breath, due to pleuritic cp. Pox on RA is 99%.  Pain is reproduced, on palpation of chest wall, right of sternum, 4th and 5th ics.   Agree with toradol. Monitor dosing with IGA nephropathy.  Plan for stress test and TTE.  I agree with a/p.

## 2021-07-13 NOTE — CONSULT NOTE ADULT - SUBJECTIVE AND OBJECTIVE BOX
[facility]                                                               Office:  [address] [place] [address]                                                              Telephone: [phone]. Fax:[phone]                                                                        CARDIOLOGY CONSULTATION NOTE                                                                                             Consult requested by:  Dr. Branch  Reason for Consultation: Chest Pain  Primary Cardiologist: None  PCP: Dr. Infante  History obtained by: Patient and medical record   obtained: No    Covid Status: Unknown    Chief complaint:    Patient is a 56y old  Female who presents with a chief complaint of chest pain.      HPI: 55 y/o F with a PMHx of IgA Nephropathy, HTN, NIDDMII, HLD, hypothyroidism, and migraines who presented to the ER with complaints of chest pain and dyspnea. Patient states she was feeling a little rundown for a few days, and then suddenly yesterday at around 17:00 she began to have a sharp substernal chest pain, non-radiating, sharp in nature, 7/10 severity, with associated shortness of breath. Patient states that the pain was constant, worse with deep breaths, and would wax and wane in intensity. Patient is still having the chest pain now at this time. Never had an issue like this before, at baseline METS>4 with no chest pain or dyspnea. Patient denies any fevers, chills, syncope, near syncope, abdominal pain, N/V/D, headache, or dizziness.      REVIEW OF SYMPTOMS:     CONSTITUTIONAL: No fever, weight loss, or fatigue  ENMT:  No difficulty hearing, tinnitus, vertigo; No sinus or throat pain  NECK: No pain or stiffness  CARDIOVASCULAR: AS PER HPI  RESPIRATORY: AS PER HPI  : No dysuria, no hematuria   GI: No dark color stool, no melena, no diarrhea, no constipation, no abdominal pain   NEURO: No headache, no dizziness, no slurred speech   MUSCULOSKELETAL: No joint pain or swelling; No muscle, back, or extremity pain  PSYCH: No agitation, no anxiety.    ALL OTHER REVIEW OF SYSTEMS ARE NEGATIVE.    ALLERGIES: Allergies    No Known Allergies    Intolerances        PAST MEDICAL HISTORY  No pertinent past medical history    Hypertension    Hyperlipidemia    Hypothyroid    IgA nephropathy    Migraines        PAST SURGICAL HISTORY  H/O:  section        FAMILY HISTORY:  Family history of hypertension (Sibling)    Family history of CHF (congestive heart failure) (Sibling)    Family history of chronic kidney disease (Sibling)     Sister- ESRD, CHF, CAD,  in her 40s      SOCIAL HISTORY:  Lives with family.  CIGARETTES:   Never smoker  ALCOHOL: Occasional EtOH  DRUGS: Denies      CURRENT MEDICATIONS:  losartan 50 milliGRAM(s) Oral daily    albuterol/ipratropium for Nebulization.   acetaminophen   Tablet ..  ketorolac   Injectable  metoclopramide  IVPB  atorvastatin  levothyroxine  metFORMIN      HOME MEDICATIONS:  Home Medications:  atorvastatin 20 mg oral tablet: 1 tab(s) orally once a day (at bedtime) (2021 22:48)  levothyroxine 200 mcg (0.2 mg) oral tablet: 1 tab(s) orally once a day (2021 22:48)  losartan 50 mg oral tablet: 1 tab(s) orally once a day (2021 22:48)  metFORMIN 750 mg oral tablet, extended release: 1 tab(s) orally 2 times a day (2021 22:48)      Vital Signs Last 24 Hrs  T(C): 36.6 (2021 07:47), Max: 37.2 (2021 18:15)  T(F): 97.9 (2021 07:47), Max: 99 (2021 18:15)  HR: 74 (2021 07:47) (71 - 112)  BP: 116/73 (2021 07:47) (96/54 - 180/140)  RR: 21 (2021 07:47) (18 - 34)  SpO2: 97% (2021 07:47) (97% - 100%)      PHYSICAL EXAM:  Constitutional: Comfortable . No acute distress.   HEENT: Atraumatic and normocephalic , neck is supple . no JVD. No carotid bruit. PEERL   CNS: A&Ox3. No focal deficits. EOMI. Cranial nerves II-IX are intact.   Lymph Nodes: Cervical : Not palpable.  Respiratory: CTAB  Cardiovascular: S1S2 RRR. No murmur/rubs or gallop. + tenderness to palpation across sternum  Gastrointestinal: Soft non-tender and non distended . +Bowel sounds. negative Hunter's sign.  Extremities: No edema.   Psychiatric: Calm . no agitation.  Skin: No skin rash/ulcers visualized to face, hands or feet.    Intake and output:     LABS:                        12.5   12.16 )-----------( 327      ( 2021 18:12 )             38.9         139  |  102  |  25.3<H>  ----------------------------<  91  4.1   |  24.0  |  1.57<H>    Ca    9.4      2021 18:12    TPro  8.1  /  Alb  3.9  /  TBili  0.4  /  DBili  x   /  AST  23  /  ALT  19  /  AlkPhos  114  12    CARDIAC MARKERS ( 2021 01:12 )  x     / <0.01 ng/mL / x     / x     / x      CARDIAC MARKERS ( 2021 18:12 )  x     / <0.01 ng/mL / x     / x     / x        ;p-BNP=Serum Pro-Brain Natriuretic Peptide: 54 pg/mL ( @ 18:12)    PT/INR - ( 2021 18:22 )   PT: 12.9 sec;   INR: 1.12 ratio         PTT - ( 2021 18:22 )  PTT:32.9 sec      INTERPRETATION OF TELEMETRY: Reviewed by me. SR, ST  ECG: Reviewed by me. NSR, NSST/T wave abnormalities     RADIOLOGY & ADDITIONAL STUDIES:    X-ray:  reviewed by me.     CT scan:   < from: CT Angio Chest PE Protocol w/ IV Cont (21 @ 18:50) >  FINDINGS: No CT evidence for pulmonary emboli.    LUNGS AND AIRWAYS: Patent central airways.  Lungs are clear.  PLEURA: No pleural effusion.  MEDIASTINUM AND NAKIA: No lymphadenopathy.  VESSELS: Within normal limits.  HEART: Heart size is normal. No pericardial effusion.  CHEST WALL AND LOWER NECK: Within normal limits.  VISUALIZED UPPER ABDOMEN: The adrenal glands appear unremarkable. 2.3 cm right hepatic cyst. An additional subcentimeter hypodense focus is noted within the hepatic dome region, too small to characterize.  BONES: Degenerative changes.    IMPRESSION: No CT evidence for pulmonary emboli. No evidence for lung parenchymal infiltrate/consolidation. No evidence for pleural effusion.      < end of copied text >    MRI:

## 2021-07-13 NOTE — CONSULT NOTE ADULT - ASSESSMENT
A/P:  55 y/o F with a PMHx of IgA Nephropathy, HTN, NIDDMII, HLD, hypothyroidism, and migraines who presented to the ER with complaints of chest pain and dyspnea. Patient states she was feeling a little rundown for a few days, and then suddenly yesterday at around 17:00 she began to have a sharp substernal chest pain, non-radiating, sharp in nature, 7/10 severity, with associated shortness of breath. Patient states that the pain was constant, worse with deep breaths, and would wax and wane in intensity. Patient is still having the chest pain now at this time. Never had an issue like this before, at baseline METS>4 with no chest pain or dyspnea. Patient denies any fevers, chills, syncope, near syncope, abdominal pain, N/V/D, headache, or dizziness.   Troponin negative x 2   BNP 56    Chest Pain  - Troponin negative x 2  - CTA negative for PE.   - NPO  - Obtain TTE.   - Nuclear stress test today.   - Likely costochondritis, continue pain control.   - If NST is negative, can f/u as an outpatient.     HTN  - Continue home meds.     HLD  - Continue statin.     Assessment and recommendations are final when note is signed by the attending.

## 2021-07-13 NOTE — ED CDU PROVIDER DISPOSITION NOTE - ATTENDING CONTRIBUTION TO CARE
Patient with chest pain.  cleared by cardio for f/u.  CT scan demonstrates no acute pathology. Lab values do not require emergent intervention. will f/u.  Non toxic.  Well appearing. Uneventful ED observation period. Discussed results and outcome of testing with the patient.  Patient advised to please follow up with their primary care doctor within the next 24 hours and return to the Emergency Department for worsening symptoms or any other concerns.  Patient advised that their doctor may call  to follow up on the specific results of the tests performed today in the emergency department.

## 2021-07-13 NOTE — ED ADULT NURSE REASSESSMENT NOTE - NS ED NURSE REASSESS COMMENT FT1
COVID SWAB SENT
called Angeline WICK to give report   will call when ready'
pt remains at testing
report given to Angeline WICK
trasnsported to ECHO   pt states 'feels better then yesterday'  family at bedside,  IVSL 22g RAC site clean/dry +flush  CM shows NSR vitals wnl
Assumed care of the patient at 1430. Verbal report received from Huong WICK ED. Patient transferred to observation unit CDU 6. Patient A&Ox4. Family at the bedside. Patient with persistent 4/10 chest pain, states pain worse with deep breathing and palpation. patient also c/o dizziness. VSS. NSR on CM. PIV patent. Patient remains NPO pending NST read. Patient in understanding of plan of care. Patient with no further questions for the RN. Resting in comfort. Call bell within reach and encouraged to use when assistance needed. Will continue to monitor.

## 2021-07-13 NOTE — ED CDU PROVIDER DISPOSITION NOTE - NSFOLLOWUPCLINICS_GEN_ALL_ED_FT
[facility]  Cardiology  [address] [place] [address]  Phone: [phone]  Fax:

## 2021-07-13 NOTE — ED CDU PROVIDER DISPOSITION NOTE - PATIENT PORTAL LINK FT
You can access the FollowMyHealth Patient Portal offered by [facility] by registering at the following website: [email or web address]. By joining [employer]’s FollowMyHealth portal, you will also be able to view your health information using other applications (apps) compatible with our system.

## 2021-07-13 NOTE — ED CDU PROVIDER DISPOSITION NOTE - CLINICAL COURSE
57 yo female PMHx IgA nephropathy, HTN, NIDDM2, HLD, hypothyroidism, migraines presents to ED c/o chest pain at approx. 1730. Patient [place] today. Patient with sternal chest pain, constant. Also with back pain described as an ache, SOB, and RLE pain over the last few days described as cramping in nature.pt is kept on ER ssc consult and pt is clear by SSC pt asymptomatic will dc  f.u   Denies fevers, chills, nausea, vomiting, diarrhea.

## 2021-07-13 NOTE — ED CDU PROVIDER SUBSEQUENT DAY NOTE - ATTENDING CONTRIBUTION TO CARE
Patient seen on morning rounds.  Pending remainder of consultation and diagnostic testing.  Non toxic.  Well appearing. Uneventful ED observation period.

## 2021-07-13 NOTE — ED CDU PROVIDER DISPOSITION NOTE - NSFOLLOWUPINSTRUCTIONS_ED_ALL_ED_FT
please continue your daily medication   please call and follow up with primary care within 24- 48 hours   please call and follow up with cardiologist within 2 weeks as given below   you will get text for covid result or you can call at [phone] for covid test result and speak with one PA's    Chest Pain    Chest pain can be caused by many different conditions which may or may not be dangerous. Causes include heartburn, lung infections, heart attack, blood clot in lungs, skin infections, strain or damage to muscle, cartilage, or bones, etc. In addition to a history and physical examination, an electrocardiogram (ECG) or other lab tests may have been performed to determine the cause of your chest pain. Follow up with your primary care provider or with a cardiologist as instructed.     SEEK IMMEDIATE MEDICAL CARE IF YOU HAVE ANY OF THE FOLLOWING SYMPTOMS: worsening chest pain, coughing up blood, unexplained back/neck/jaw pain, severe abdominal pain, dizziness or lightheadedness, fainting, shortness of breath, sweaty or clammy skin, vomiting, or racing heart beat. These symptoms may represent a serious problem that is an emergency. Do not wait to see if the symptoms will go away. Get medical help right away. Call 911 and do not drive yourself to the hospital.

## 2021-08-10 PROBLEM — N02.8 RECURRENT AND PERSISTENT HEMATURIA WITH OTHER MORPHOLOGIC CHANGES: Chronic | Status: ACTIVE | Noted: 2021-07-12

## 2021-08-10 PROBLEM — E78.5 HYPERLIPIDEMIA, UNSPECIFIED: Chronic | Status: ACTIVE | Noted: 2021-07-12

## 2021-08-10 PROBLEM — G43.909 MIGRAINE, UNSPECIFIED, NOT INTRACTABLE, WITHOUT STATUS MIGRAINOSUS: Chronic | Status: ACTIVE | Noted: 2021-07-12

## 2021-08-10 PROBLEM — E03.9 HYPOTHYROIDISM, UNSPECIFIED: Chronic | Status: ACTIVE | Noted: 2021-07-12

## 2021-08-10 PROBLEM — I10 ESSENTIAL (PRIMARY) HYPERTENSION: Chronic | Status: ACTIVE | Noted: 2021-07-12

## 2021-09-09 ENCOUNTER — APPOINTMENT (OUTPATIENT)
Dept: CARDIOLOGY | Facility: CLINIC | Age: 56
End: 2021-09-09

## 2021-09-09 DIAGNOSIS — I10 ESSENTIAL (PRIMARY) HYPERTENSION: ICD-10-CM

## 2021-09-09 DIAGNOSIS — Z82.49 FAMILY HISTORY OF ISCHEMIC HEART DISEASE AND OTHER DISEASES OF THE CIRCULATORY SYSTEM: ICD-10-CM

## 2021-09-09 DIAGNOSIS — E78.5 HYPERLIPIDEMIA, UNSPECIFIED: ICD-10-CM

## 2021-09-09 DIAGNOSIS — E03.9 HYPOTHYROIDISM, UNSPECIFIED: ICD-10-CM

## 2021-09-09 DIAGNOSIS — Z84.1 FAMILY HISTORY OF DISORDERS OF KIDNEY AND URETER: ICD-10-CM

## 2021-09-09 DIAGNOSIS — G43.909 MIGRAINE, UNSPECIFIED, NOT INTRACTABLE, W/OUT STATUS MIGRAINOSUS: ICD-10-CM

## 2021-09-09 DIAGNOSIS — Z78.9 OTHER SPECIFIED HEALTH STATUS: ICD-10-CM

## 2021-09-09 DIAGNOSIS — N02.8 RECURRENT AND PERSISTENT HEMATURIA WITH OTHER MORPHOLOGIC CHANGES: ICD-10-CM

## 2021-09-09 DIAGNOSIS — E11.9 TYPE 2 DIABETES MELLITUS W/OUT COMPLICATIONS: ICD-10-CM

## 2021-09-09 DIAGNOSIS — R07.9 CHEST PAIN, UNSPECIFIED: ICD-10-CM

## 2021-09-09 RX ORDER — LEVOTHYROXINE SODIUM 0.2 MG/1
200 TABLET ORAL DAILY
Refills: 0 | Status: ACTIVE | COMMUNITY
Start: 2021-09-09

## 2021-09-09 RX ORDER — METFORMIN ER 750 MG 750 MG/1
750 TABLET ORAL TWICE DAILY
Refills: 0 | Status: ACTIVE | COMMUNITY
Start: 2021-09-09

## 2021-09-09 RX ORDER — LOSARTAN POTASSIUM 50 MG/1
50 TABLET, FILM COATED ORAL
Qty: 90 | Refills: 3 | Status: ACTIVE | COMMUNITY
Start: 2021-09-09

## 2021-09-09 RX ORDER — ATORVASTATIN CALCIUM 20 MG/1
20 TABLET, FILM COATED ORAL
Qty: 90 | Refills: 0 | Status: ACTIVE | COMMUNITY
Start: 2021-09-09

## 2022-10-04 NOTE — ED ADULT NURSE NOTE - CAS EDN DISCHARGE INTERVENTIONS
[Negative] : Allergic/Immunologic [Fatigue] : no fatigue [Dysmenorrhea/Abn Vaginal Bleeding] : no dysmenorrhea/abnormal vaginal bleeding IV discontinued, cath removed intact

## 2023-10-16 NOTE — ED ADULT NURSE NOTE - CCCP TRG CHIEF CMPLNT
headache Olumiant Pregnancy And Lactation Text: Based on animal studies, Olumiant may cause embryo-fetal harm when administered to pregnant women.  The medication should not be used in pregnancy.  Breastfeeding is not recommended during treatment.

## 2023-11-14 ENCOUNTER — EMERGENCY (EMERGENCY)
Facility: HOSPITAL | Age: 58
LOS: 1 days | Discharge: HOME | End: 2023-11-14
Attending: EMERGENCY MEDICINE
Payer: MEDICAID

## 2023-11-14 VITALS
DIASTOLIC BLOOD PRESSURE: 92 MMHG | WEIGHT: 132.06 LBS | OXYGEN SATURATION: 99 % | TEMPERATURE: 98 F | HEART RATE: 71 BPM | SYSTOLIC BLOOD PRESSURE: 154 MMHG | RESPIRATION RATE: 20 BRPM

## 2023-11-14 DIAGNOSIS — Z98.891 HISTORY OF UTERINE SCAR FROM PREVIOUS SURGERY: Chronic | ICD-10-CM

## 2023-11-14 PROCEDURE — 99282 EMERGENCY DEPT VISIT SF MDM: CPT

## 2023-11-14 PROCEDURE — 99284 EMERGENCY DEPT VISIT MOD MDM: CPT | Mod: 25

## 2023-11-14 PROCEDURE — 10060 I&D ABSCESS SIMPLE/SINGLE: CPT

## 2023-11-14 NOTE — ED PROVIDER NOTE - PATIENT PORTAL LINK FT
You can access the FollowMyHealth Patient Portal offered by [facility] by registering at the following website: [email or web address]. By joining [employer]’s FollowMyHealth portal, you will also be able to view your health information using other applications (apps) compatible with our system.

## 2023-11-14 NOTE — ED ADULT TRIAGE NOTE - CHIEF COMPLAINT QUOTE
left side back cyst that burst. c/o pain and itchy, couldn't get in to see dermatologist without referral

## 2023-11-14 NOTE — ED ADULT NURSE NOTE - OBJECTIVE STATEMENT
pt has an abscess that has opened in the upper left upper back. Pt c/o pressure, itching burning  like pain rating 10/10. Unable to go to a dermatologist without referral. also endorse fever AO4. NAD.

## 2023-11-14 NOTE — ED PROVIDER NOTE - ATTENDING APP SHARED VISIT CONTRIBUTION OF CARE
I performed a history and physical exam of the patient and discussed their management with the advanced care provider. I reviewed the advanced care provider's note and agree with the documented findings and plan of care. My medical decision making and objective findings are found below.      58-year-old female presenting with  pain to back, on exam patient likely with infected dilated pore of Dorothy.  Status post I&D by VIJAY Garrett, stable for DC

## 2025-03-12 NOTE — ED ADULT NURSE NOTE - HISTORY OF COVID-19 VACCINATION
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  Fissure in the midline area of the clitoral white Yes